# Patient Record
Sex: FEMALE | Race: WHITE | Employment: UNEMPLOYED | RURAL
[De-identification: names, ages, dates, MRNs, and addresses within clinical notes are randomized per-mention and may not be internally consistent; named-entity substitution may affect disease eponyms.]

---

## 2023-03-10 ENCOUNTER — APPOINTMENT (OUTPATIENT)
Dept: GENERAL RADIOLOGY | Facility: HOSPITAL | Age: 48
End: 2023-03-10

## 2023-03-10 ENCOUNTER — HOSPITAL ENCOUNTER (EMERGENCY)
Facility: HOSPITAL | Age: 48
Discharge: HOME | End: 2023-03-10
Attending: EMERGENCY MEDICINE

## 2023-03-10 VITALS
OXYGEN SATURATION: 100 % | DIASTOLIC BLOOD PRESSURE: 103 MMHG | RESPIRATION RATE: 18 BRPM | TEMPERATURE: 99 F | HEART RATE: 111 BPM | BODY MASS INDEX: 28.32 KG/M2 | HEIGHT: 61 IN | SYSTOLIC BLOOD PRESSURE: 138 MMHG | WEIGHT: 150 LBS

## 2023-03-10 DIAGNOSIS — J44.1 COPD EXACERBATION (HCC): Primary | ICD-10-CM

## 2023-03-10 DIAGNOSIS — J42 ACUTE EXACERBATION OF CHRONIC BRONCHITIS (HCC): ICD-10-CM

## 2023-03-10 DIAGNOSIS — J20.9 ACUTE EXACERBATION OF CHRONIC BRONCHITIS (HCC): ICD-10-CM

## 2023-03-10 LAB
RAPID INFLUENZA  B AGN: NEGATIVE
RAPID INFLUENZA A AGN: NEGATIVE
S PYO AG THROAT QL: NEGATIVE
SARS-COV-2, NAAT: NOT DETECTED

## 2023-03-10 PROCEDURE — 6370000000 HC RX 637 (ALT 250 FOR IP): Performed by: EMERGENCY MEDICINE

## 2023-03-10 PROCEDURE — 71045 X-RAY EXAM CHEST 1 VIEW: CPT

## 2023-03-10 PROCEDURE — 87804 INFLUENZA ASSAY W/OPTIC: CPT

## 2023-03-10 PROCEDURE — 87635 SARS-COV-2 COVID-19 AMP PRB: CPT

## 2023-03-10 PROCEDURE — 99284 EMERGENCY DEPT VISIT MOD MDM: CPT

## 2023-03-10 PROCEDURE — 87880 STREP A ASSAY W/OPTIC: CPT

## 2023-03-10 PROCEDURE — 94664 DEMO&/EVAL PT USE INHALER: CPT

## 2023-03-10 PROCEDURE — 94640 AIRWAY INHALATION TREATMENT: CPT

## 2023-03-10 RX ORDER — AMOXICILLIN AND CLAVULANATE POTASSIUM 875; 125 MG/1; MG/1
1 TABLET, FILM COATED ORAL 2 TIMES DAILY
Qty: 20 TABLET | Refills: 0 | Status: SHIPPED | OUTPATIENT
Start: 2023-03-10 | End: 2023-03-20

## 2023-03-10 RX ORDER — AMOXICILLIN AND CLAVULANATE POTASSIUM 875; 125 MG/1; MG/1
1 TABLET, FILM COATED ORAL ONCE
Status: COMPLETED | OUTPATIENT
Start: 2023-03-10 | End: 2023-03-10

## 2023-03-10 RX ORDER — ALBUTEROL SULFATE 90 UG/1
2 AEROSOL, METERED RESPIRATORY (INHALATION) 4 TIMES DAILY PRN
Qty: 4 EACH | Refills: 5 | Status: SHIPPED | OUTPATIENT
Start: 2023-03-10 | End: 2023-04-09

## 2023-03-10 RX ORDER — PREDNISONE 20 MG/1
60 TABLET ORAL DAILY
Qty: 21 TABLET | Refills: 0 | Status: SHIPPED | OUTPATIENT
Start: 2023-03-10 | End: 2023-03-17

## 2023-03-10 RX ORDER — IPRATROPIUM BROMIDE AND ALBUTEROL SULFATE 2.5; .5 MG/3ML; MG/3ML
1 SOLUTION RESPIRATORY (INHALATION)
Status: DISCONTINUED | OUTPATIENT
Start: 2023-03-10 | End: 2023-03-10 | Stop reason: HOSPADM

## 2023-03-10 RX ADMIN — IPRATROPIUM BROMIDE AND ALBUTEROL SULFATE 1 AMPULE: .5; 3 SOLUTION RESPIRATORY (INHALATION) at 20:00

## 2023-03-10 RX ADMIN — AMOXICILLIN AND CLAVULANATE POTASSIUM 1 TABLET: 875; 125 TABLET, FILM COATED ORAL at 19:56

## 2023-03-10 RX ADMIN — PREDNISONE 60 MG: 10 TABLET ORAL at 19:56

## 2023-03-10 ASSESSMENT — LIFESTYLE VARIABLES
HOW MANY STANDARD DRINKS CONTAINING ALCOHOL DO YOU HAVE ON A TYPICAL DAY: PATIENT DOES NOT DRINK
HOW OFTEN DO YOU HAVE A DRINK CONTAINING ALCOHOL: NEVER

## 2023-03-10 ASSESSMENT — PAIN - FUNCTIONAL ASSESSMENT: PAIN_FUNCTIONAL_ASSESSMENT: NONE - DENIES PAIN

## 2023-03-11 ASSESSMENT — ENCOUNTER SYMPTOMS
ABDOMINAL PAIN: 0
STRIDOR: 0
BACK PAIN: 0
SORE THROAT: 1
WHEEZING: 1
RHINORRHEA: 1
EYE PAIN: 0
PHOTOPHOBIA: 0
VOMITING: 0
DIARRHEA: 0
CHEST TIGHTNESS: 0
NAUSEA: 0
SINUS PRESSURE: 0
SHORTNESS OF BREATH: 1
COUGH: 1
EYE REDNESS: 0

## 2023-03-11 NOTE — ED PROVIDER NOTES
[facility] ENCOUNTER      Pt Name: Maranda Henriquez  MRN: [id number]  [place] 1975  Date of evaluation: 3/10/2023  Provider: Nick Covarrubias MD    CHIEF COMPLAINT     No chief complaint on file. HISTORY OF PRESENT ILLNESS   (Location/Symptom, Timing/Onset, Context/Setting, Quality, Duration, Modifying Factors, Severity)  Note limiting factors. Maranda Henriquez is a 52 y.o. female who presents to the emergency department with shortness of breath, sore throat, nonproductive cough, body aches, subjective fever for the past 3 to 4 days. Patient stated she ran out of her inhalers few months ago, and failed to follow-up with her PCP for refills. She continues to walk 1 to 2 packs of cigarettes per day. Denies any chest pain. No diaphoresis. No recent travel, no recent exposure to sick contacts. The history is provided by the patient. No  was used. Nursing Notes were reviewed. REVIEW OF SYSTEMS    (2-9 systems for level 4, 10 or more for level 5)     Review of Systems   Constitutional:  Negative for appetite change, chills, diaphoresis, fatigue, fever and unexpected weight change. HENT:  Positive for congestion, postnasal drip, rhinorrhea and sore throat. Negative for dental problem, ear discharge, ear pain, hearing loss, mouth sores, nosebleeds, sinus pressure and sneezing. Eyes:  Negative for photophobia, pain and redness. Respiratory:  Positive for cough, shortness of breath and wheezing. Negative for chest tightness and stridor. Cardiovascular:  Negative for chest pain and leg swelling. Gastrointestinal:  Negative for abdominal pain, diarrhea, nausea and vomiting. Endocrine: Negative for cold intolerance and heat intolerance. Genitourinary:  Negative for difficulty urinating. Musculoskeletal:  Negative for arthralgias and back pain. Skin:  Negative for pallor and rash.    Neurological:  Negative for dizziness, seizures, speech difficulty, weakness, numbness and headaches. Hematological:  Negative for adenopathy. Psychiatric/Behavioral:  Negative for agitation, self-injury, sleep disturbance and suicidal ideas. The patient is not nervous/anxious. All other systems reviewed and are negative. Except as noted above the remainder of the review of systems was reviewed and negative. PAST MEDICAL HISTORY   History reviewed. No pertinent past medical history. SURGICAL HISTORY       Past Surgical History:   Procedure Laterality Date    APPENDECTOMY       SECTION      HYSTERECTOMY (CERVIX STATUS UNKNOWN)      MOUTH SURGERY           CURRENT MEDICATIONS       Discharge Medication List as of 3/10/2023  7:56 PM          ALLERGIES     Zithromax [azithromycin]    FAMILY HISTORY     History reviewed. No pertinent family history. SOCIAL HISTORY       Social History     Socioeconomic History    Marital status:      Spouse name: None    Number of children: None    Years of education: None    Highest education level: None   Tobacco Use    Smoking status: Every Day     Packs/day: 1.00     Years: 30.00     Pack years: 30.00     Types: Cigarettes    Smokeless tobacco: Never   Vaping Use    Vaping Use: Never used   Substance and Sexual Activity    Alcohol use: Not Currently    Drug use: Never       SCREENINGS         [place] Coma Scale  Eye Opening: Spontaneous  Best Verbal Response: Oriented  Best Motor Response: Obeys commands  [place] Coma Scale Score: 15                     CIWA Assessment  BP: (!) 138/103  Heart Rate: (!) 111                 PHYSICAL EXAM    (up to 7 for level 4, 8 or more for level 5)     ED Triage Vitals [03/10/23 1911]   BP Temp Temp Source Heart Rate Resp SpO2 Height Weight   (!) 161/111 99 °F (37.2 °C) Oral (!) 111 20 99 % 5' 1\" (1.549 m) 150 lb (68 kg)       Physical Exam  Vitals and nursing note reviewed. Constitutional:       General: She is in acute distress. Appearance: Normal appearance. She is normal weight. She is not ill-appearing, toxic-appearing or diaphoretic. HENT:      Head: Normocephalic and atraumatic. Nose: Nose normal.      Mouth/Throat:      Mouth: Mucous membranes are moist.      Pharynx: Oropharynx is clear. Eyes:      Extraocular Movements: Extraocular movements intact. Conjunctiva/sclera: Conjunctivae normal.      Pupils: Pupils are equal, round, and reactive to light. Cardiovascular:      Rate and Rhythm: Regular rhythm. Tachycardia present. Pulses: Normal pulses. Heart sounds: Normal heart sounds. No murmur heard. Pulmonary:      Effort: Respiratory distress present. Breath sounds: No stridor. Wheezing present. No rhonchi. Abdominal:      General: Abdomen is flat. Bowel sounds are normal.      Palpations: Abdomen is soft. Musculoskeletal:         General: Normal range of motion. Cervical back: Normal range of motion and neck supple. No rigidity or tenderness. Skin:     General: Skin is warm and dry. Capillary Refill: Capillary refill takes 2 to 3 seconds. Neurological:      General: No focal deficit present. Mental Status: She is alert and oriented to person, place, and time. Mental status is at baseline. Psychiatric:         Mood and Affect: Mood normal.         Behavior: Behavior normal.         Thought Content:  Thought content normal.         Judgment: Judgment normal.       DIAGNOSTIC RESULTS     EKG: All EKG's are interpreted by the Emergency Department Physician who either signs or Co-signs this chart in the absence of a cardiologist.        RADIOLOGY:   Non-plain film images such as CT, Ultrasound and MRI are read by the radiologist. Plain radiographic images are visualized and preliminarily interpreted by the emergency physician with the below findings:        Interpretation per the Radiologist below, if available at the time of this note:    XR CHEST PORTABLE   Final Result      No acute disease. ED BEDSIDE ULTRASOUND:   Performed by ED Physician - none    LABS:  Labs Reviewed   RAPID INFLUENZA A/B ANTIGENS   COVID-19, RAPID   STREP SCREEN GROUP A THROAT       All other labs were within normal range or not returned as of this dictation. EMERGENCY DEPARTMENT COURSE and DIFFERENTIAL DIAGNOSIS/MDM:   Vitals:    Vitals:    03/10/23 1913 03/10/23 1915 03/10/23 2001 03/10/23 2014   BP:  (!) 138/103     Pulse:       Resp:    18   Temp:       TempSrc:       SpO2: 98% 100% 100% 100%   Weight:       Height:               Medical Decision Making  Orquidea Powers is a 52 y.o. female who presents to the emergency department with shortness of breath, sore throat, nonproductive cough, body aches, subjective fever for the past 3 to 4 days. Patient stated she ran out of her inhalers few months ago, and failed to follow-up with her PCP for refills. She continues to walk 1 to 2 packs of cigarettes per day. Denies any chest pain. No diaphoresis. No recent travel, no recent exposure to sick contacts. Problems Addressed:  Acute exacerbation of chronic bronchitis ([address]): acute illness or injury  COPD exacerbation ([address]): acute illness or injury    Amount and/or Complexity of Data Reviewed  External Data Reviewed: labs, radiology, ECG and notes. Labs: ordered. Decision-making details documented in ED Course. Radiology: ordered and independent interpretation performed. Decision-making details documented in ED Course. ECG/medicine tests: ordered and independent interpretation performed. Decision-making details documented in ED Course. Risk  Prescription drug management. REASSESSMENT      Upon examination patient is medically stable, significantly improved, wheezing decreased. Patient categorically refused any IV sticks, therefore all meds were given orally.     Patient urged to consider stopping smoking, will send home with prescriptions for oral antibiotics and oral steroids. Refill patient's inhaler, urged her to follow-up with PCP if not better in a couple of days for    CRITICAL CARE TIME   Total Critical Care time was 0 minutes, excluding separately reportable procedures. There was a high probability of clinically significant/life threatening deterioration in the patient's condition which required my urgent intervention. CONSULTS:  None    PROCEDURES:  Unless otherwise noted below, none     Procedures        FINAL IMPRESSION      1. COPD exacerbation (HCC) New Problem   2. Acute exacerbation of chronic bronchitis ([address] New Problem         DISPOSITION/PLAN   DISPOSITION Decision To Discharge 03/10/2023 07:52:47 PM      PATIENT REFERRED TO:  your PCP    Schedule an appointment as soon as possible for a visit in 3 days  As needed, If symptoms worsen    [facility] Emergency Department  [facility] [address] [facility]  [phone]    If unable to see PCP timely    Regina Feng MD  [id number]  [phone]    Schedule an appointment as soon as possible for a visit in 3 days        DISCHARGE MEDICATIONS:  Discharge Medication List as of 3/10/2023  7:56 PM        START taking these medications    Details   predniSONE (DELTASONE) 20 MG tablet Take 3 tablets by mouth daily for 7 days, Disp-21 tablet, R-0Normal      amoxicillin-clavulanate (AUGMENTIN) 875-125 MG per tablet Take 1 tablet by mouth 2 times daily for 10 days, Disp-20 tablet, R-0Normal      albuterol sulfate HFA (VENTOLIN HFA) 108 (90 Base) MCG/ACT inhaler Inhale 2 puffs into the lungs 4 times daily as needed for Wheezing or Shortness of Breath, Disp-4 each, R-5Normal           Controlled Substances Monitoring:     No flowsheet data found.     (Please note that portions of this note were completed with a voice recognition program.  Efforts were made to edit the dictations but occasionally words are mis-transcribed.)    April Rincon MD (electronically signed)  Attending Emergency Physician            Alcides Ratliff MD  03/11/23 [id number]

## 2023-03-11 NOTE — ED NOTES
Patient with c/o cough, weakness, fatigue and sore throat for 3 or 4 days.      Lor Almanza, RN  03/10/23 1929

## 2023-03-11 NOTE — DISCHARGE INSTRUCTIONS
Please consider cutting down on smoking. Please  prescriptions for inhaler, antibiotic and steroids from your local pharmacy in the morning. Follow-up with PCP in the office on Monday for reevaluation. If any new/acute symptoms or worsening of current presentation please go to the closest urgent care or emergency room for reevaluation.

## 2023-03-11 NOTE — ED NOTES
Pt left ED ambulatory with belongings at this time. Pt verbalized understanding of discharge instructions and prescriptions.      Georgina Santoyo RN  03/10/23 2020